# Patient Record
Sex: FEMALE | Race: WHITE | NOT HISPANIC OR LATINO
[De-identification: names, ages, dates, MRNs, and addresses within clinical notes are randomized per-mention and may not be internally consistent; named-entity substitution may affect disease eponyms.]

---

## 2021-09-15 ENCOUNTER — NON-APPOINTMENT (OUTPATIENT)
Age: 51
End: 2021-09-15

## 2021-09-15 PROBLEM — Z00.00 ENCOUNTER FOR PREVENTIVE HEALTH EXAMINATION: Status: ACTIVE | Noted: 2021-09-15

## 2021-09-16 ENCOUNTER — NON-APPOINTMENT (OUTPATIENT)
Age: 51
End: 2021-09-16

## 2021-09-16 ENCOUNTER — APPOINTMENT (OUTPATIENT)
Dept: SURGERY | Facility: CLINIC | Age: 51
End: 2021-09-16
Payer: COMMERCIAL

## 2021-09-16 VITALS
SYSTOLIC BLOOD PRESSURE: 131 MMHG | HEART RATE: 73 BPM | WEIGHT: 198 LBS | DIASTOLIC BLOOD PRESSURE: 80 MMHG | BODY MASS INDEX: 29.33 KG/M2 | HEIGHT: 69 IN

## 2021-09-16 DIAGNOSIS — Z83.49 FAMILY HISTORY OF OTHER ENDOCRINE, NUTRITIONAL AND METABOLIC DISEASES: ICD-10-CM

## 2021-09-16 DIAGNOSIS — Z86.711 PERSONAL HISTORY OF PULMONARY EMBOLISM: ICD-10-CM

## 2021-09-16 DIAGNOSIS — Z87.891 PERSONAL HISTORY OF NICOTINE DEPENDENCE: ICD-10-CM

## 2021-09-16 DIAGNOSIS — Z78.9 OTHER SPECIFIED HEALTH STATUS: ICD-10-CM

## 2021-09-16 PROCEDURE — 99204 OFFICE O/P NEW MOD 45 MIN: CPT

## 2021-09-16 RX ORDER — MONTELUKAST SODIUM 10 MG/1
TABLET, FILM COATED ORAL
Refills: 0 | Status: ACTIVE | COMMUNITY

## 2021-09-18 PROBLEM — Z83.49 FAMILY HISTORY OF HYPOTHYROIDISM: Status: ACTIVE | Noted: 2021-09-18

## 2021-09-18 PROBLEM — Z86.711 HISTORY OF PULMONARY EMBOLISM: Status: RESOLVED | Noted: 2021-09-18 | Resolved: 2021-09-18

## 2021-09-18 PROBLEM — Z87.891 FORMER SMOKER: Status: ACTIVE | Noted: 2021-09-18

## 2021-09-18 PROBLEM — Z78.9 DENIES ALCOHOL CONSUMPTION: Status: ACTIVE | Noted: 2021-09-18

## 2021-09-18 NOTE — PHYSICAL EXAM
[de-identified] : no cervical or supraclavicular adenopathy, trachea midline, thyroid without enlargement or palpable mass [de-identified] : Skin:  normal appearance.  no rash, nodules, vesicles, or erythema,\par Musculoskeletal:  full range of motion and no deformities appreciated\par Neurological:  grossly intact\par Psychiatric:  oriented to person, place and time with appropriate affect [Normal] : no neck adenopathy

## 2021-09-18 NOTE — CONSULT LETTER
[Dear  ___] : Dear  [unfilled], [Consult Letter:] : I had the pleasure of evaluating your patient, [unfilled]. [Please see my note below.] : Please see my note below. [Consult Closing:] : Thank you very much for allowing me to participate in the care of this patient.  If you have any questions, please do not hesitate to contact me. [FreeTextEntry3] : Sincerely yours,\par \par Krupa Arevalo MD, FACS\par Assistant Professor of Surgery and Otolaryngology\par [facility] [FreeTextEntry2] : Dr. Craig Perlman [DrHiwot  ___] : Dr. JAIN

## 2021-09-18 NOTE — ASSESSMENT
[FreeTextEntry1] : Patient with suspicious right thyroid nodule and a multinodular goiter. I have recommended thyroid surgery for definitive diagnosis. The patient would like to proceed with a total thyroidectomy with central neck dissection.  I have reviewed the pathophysiology of the disease process, the area anatomy and the rationale for surgery.  I have discussed the risks, benefits and alternative treatments which include but are not limited to bleeding, infection, numbness, hoarseness, hypocalcemia, scarring, and need for reoperation. I have answered the patient's questions to their satisfaction. The patient wishes to proceed with recommended procedure.They will contact my office to schedule surgery.

## 2021-09-18 NOTE — HISTORY OF PRESENT ILLNESS
[de-identified] : Patient referred by Dr. Perlman for evaluation of suspicious thyroid nodule in a multinodular goiter. Patient was present at 9/11. Reports history of left thyroid nodule for several years. Denies prior biopsy. Recent ultrasound , right lobe 5.1 x 1.6 x 1.3 CM with new upper nodule measuring 11 x 10 x 6 mm. Left lobe 4.4 x 1.5 x 1.5 CM with a 6 mm. Upper and 9 mm. It nodules. Biopsy right nodule, follicular neoplasm, Thyroseq positive with 60% risk of malignancy.  Blood work, July 2021: Calcium 9.4, PTH 61, vitamin D 18. Patient denies dysphagia, reports occasional voice change due to postnasal drip. Patient had PE after sedentary job, hematology work up negative.   I have reviewed all old and new data and available images.  Additional information was obtained from others present at the time of the visit to ensure the completeness of the history.\par

## 2021-09-18 NOTE — REASON FOR VISIT
[Initial Consultation] : an initial consultation for [FreeTextEntry2] : suspicious thyroid nodule in  a MNG [Spouse] : spouse

## 2021-09-22 ENCOUNTER — OUTPATIENT (OUTPATIENT)
Dept: OUTPATIENT SERVICES | Facility: HOSPITAL | Age: 51
LOS: 1 days | End: 2021-09-22
Payer: COMMERCIAL

## 2021-09-22 ENCOUNTER — TRANSCRIPTION ENCOUNTER (OUTPATIENT)
Age: 51
End: 2021-09-22

## 2021-09-22 VITALS
HEART RATE: 69 BPM | WEIGHT: 202.83 LBS | OXYGEN SATURATION: 100 % | SYSTOLIC BLOOD PRESSURE: 127 MMHG | HEIGHT: 68 IN | RESPIRATION RATE: 16 BRPM | DIASTOLIC BLOOD PRESSURE: 86 MMHG | TEMPERATURE: 99 F

## 2021-09-22 DIAGNOSIS — Z98.890 OTHER SPECIFIED POSTPROCEDURAL STATES: Chronic | ICD-10-CM

## 2021-09-22 DIAGNOSIS — D49.7 NEOPLASM OF UNSPECIFIED BEHAVIOR OF ENDOCRINE GLANDS AND OTHER PARTS OF NERVOUS SYSTEM: ICD-10-CM

## 2021-09-22 DIAGNOSIS — I26.99 OTHER PULMONARY EMBOLISM WITHOUT ACUTE COR PULMONALE: ICD-10-CM

## 2021-09-22 DIAGNOSIS — Z01.818 ENCOUNTER FOR OTHER PREPROCEDURAL EXAMINATION: ICD-10-CM

## 2021-09-22 DIAGNOSIS — I80.00 PHLEBITIS AND THROMBOPHLEBITIS OF SUPERFICIAL VESSELS OF UNSPECIFIED LOWER EXTREMITY: ICD-10-CM

## 2021-09-22 DIAGNOSIS — Z98.891 HISTORY OF UTERINE SCAR FROM PREVIOUS SURGERY: Chronic | ICD-10-CM

## 2021-09-22 LAB
HCT VFR BLD CALC: 41.1 % — SIGNIFICANT CHANGE UP (ref 34.5–45)
HGB BLD-MCNC: 14.1 G/DL — SIGNIFICANT CHANGE UP (ref 11.5–15.5)
MCHC RBC-ENTMCNC: 31.6 PG — SIGNIFICANT CHANGE UP (ref 27–34)
MCHC RBC-ENTMCNC: 34.3 GM/DL — SIGNIFICANT CHANGE UP (ref 32–36)
MCV RBC AUTO: 92.2 FL — SIGNIFICANT CHANGE UP (ref 80–100)
NRBC # BLD: 0 /100 WBCS — SIGNIFICANT CHANGE UP (ref 0–0)
PLATELET # BLD AUTO: 228 K/UL — SIGNIFICANT CHANGE UP (ref 150–400)
RBC # BLD: 4.46 M/UL — SIGNIFICANT CHANGE UP (ref 3.8–5.2)
RBC # FLD: 12.3 % — SIGNIFICANT CHANGE UP (ref 10.3–14.5)
WBC # BLD: 4.96 K/UL — SIGNIFICANT CHANGE UP (ref 3.8–10.5)
WBC # FLD AUTO: 4.96 K/UL — SIGNIFICANT CHANGE UP (ref 3.8–10.5)

## 2021-09-22 PROCEDURE — 85027 COMPLETE CBC AUTOMATED: CPT

## 2021-09-22 PROCEDURE — 36415 COLL VENOUS BLD VENIPUNCTURE: CPT

## 2021-09-22 PROCEDURE — G0463: CPT

## 2021-09-22 NOTE — H&P PST ADULT - NSANTHSNORERD_ENT_A_CORE
PT states compliant c tx. Denies any new medications or bleeding issues. Denies any s/s of blood clot. Instructed to continue same dose and Coumadin friendly diet. PT will be seen in 1 month. Patient instructed regarding medication; results given and questions answered. Nutritional counseling given.  Dietary factors affecting therapy addressed.  Patient instructed to monitor for excessive bruising or bleeding. PT verbalizes understanding.         This document has been electronically signed by DAISY Hoff @ on December 28, 2018 3:14 PM      
No

## 2021-09-22 NOTE — H&P PST ADULT - HISTORY OF PRESENT ILLNESS
This 50 year old female with pre-operative diagnosis of lesion on thyroid gland.  s/p biopsy of nodule which returned wtih a 60% chance of turning into a malignancy.  Patient has a long history of thyroid nodule on left side, however the 2 other nodules were more recent.  Denies any other medical issues.  Feels well today. Denies any complaints.  H/o pulmonary embolism x 3 and DVT x1 in 10/2020.  In 10/2020, patient reports she was working five 12 hr shifts consecutively which required her to sit for 12 hrs straight.  Two days later she developed chest pain  and leg pain with SOB. She was diagnosed with PE and DVT.  Denied any recent surgery or hormone use.  S/p workup with hematologist, with unknown etiology.  Patient was given a blood thinner for 6 months, which was discontinued 5/2021.  Denies any events since that time.  She is currently under the care of a pulmonologist for h/o PE's.  She is scheduled to have CT of chest on 9/23/21.  Patient has been getting follow up CT every 3 months since the pulmonary emboli in 10/2020.

## 2021-09-22 NOTE — H&P PST ADULT - NSICDXPASTMEDICALHX_GEN_ALL_CORE_FT
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

## 2021-09-22 NOTE — H&P PST ADULT - NSICDXFAMILYHX_GEN_ALL_CORE_FT
FAMILY HISTORY:  Father  Still living? Unknown  FH: type 2 diabetes, Age at diagnosis: Age Unknown    Mother  Still living? Unknown  FH: HTN (hypertension), Age at diagnosis: Age Unknown

## 2021-09-22 NOTE — H&P PST ADULT - NSANTHOSAYNRD_GEN_A_CORE
No. ROBER screening performed.  STOP BANG Legend: 0-2 = LOW Risk; 3-4 = INTERMEDIATE Risk; 5-8 = HIGH Risk

## 2021-09-22 NOTE — H&P PST ADULT - PROBLEM SELECTOR PLAN 1
Patient provided with pre-operative instructions and verbalized understanding.  Patient will be NPO on day of surgery. Patient will stop NSAIDs, aspirin, herbal supplements or vitamins 1 week prior to surgery.  Chlorohexadine wash provided with instructions.  Pending hematology clearance due to recent h/o PE and DVT.  COVID PCR pending per policy.

## 2021-09-30 ENCOUNTER — LABORATORY RESULT (OUTPATIENT)
Age: 51
End: 2021-09-30

## 2021-10-01 ENCOUNTER — APPOINTMENT (OUTPATIENT)
Dept: DISASTER EMERGENCY | Facility: CLINIC | Age: 51
End: 2021-10-01

## 2021-10-01 PROBLEM — I26.99 OTHER PULMONARY EMBOLISM WITHOUT ACUTE COR PULMONALE: Chronic | Status: ACTIVE | Noted: 2021-09-22

## 2021-10-01 PROBLEM — D49.7 NEOPLASM OF UNSPECIFIED BEHAVIOR OF ENDOCRINE GLANDS AND OTHER PARTS OF NERVOUS SYSTEM: Chronic | Status: ACTIVE | Noted: 2021-09-22

## 2021-10-01 PROBLEM — I82.409 ACUTE EMBOLISM AND THROMBOSIS OF UNSPECIFIED DEEP VEINS OF UNSPECIFIED LOWER EXTREMITY: Chronic | Status: ACTIVE | Noted: 2021-09-22

## 2021-10-03 ENCOUNTER — TRANSCRIPTION ENCOUNTER (OUTPATIENT)
Age: 51
End: 2021-10-03

## 2021-10-04 ENCOUNTER — OUTPATIENT (OUTPATIENT)
Dept: OUTPATIENT SERVICES | Facility: HOSPITAL | Age: 51
LOS: 1 days | End: 2021-10-04
Payer: COMMERCIAL

## 2021-10-04 ENCOUNTER — APPOINTMENT (OUTPATIENT)
Dept: SURGERY | Facility: HOSPITAL | Age: 51
End: 2021-10-04

## 2021-10-04 ENCOUNTER — RESULT REVIEW (OUTPATIENT)
Age: 51
End: 2021-10-04

## 2021-10-04 VITALS
TEMPERATURE: 98 F | SYSTOLIC BLOOD PRESSURE: 146 MMHG | HEART RATE: 76 BPM | DIASTOLIC BLOOD PRESSURE: 83 MMHG | OXYGEN SATURATION: 98 % | RESPIRATION RATE: 16 BRPM

## 2021-10-04 VITALS
DIASTOLIC BLOOD PRESSURE: 85 MMHG | WEIGHT: 202.83 LBS | SYSTOLIC BLOOD PRESSURE: 139 MMHG | TEMPERATURE: 98 F | HEART RATE: 85 BPM | HEIGHT: 68 IN | OXYGEN SATURATION: 97 % | RESPIRATION RATE: 14 BRPM

## 2021-10-04 DIAGNOSIS — D49.7 NEOPLASM OF UNSPECIFIED BEHAVIOR OF ENDOCRINE GLANDS AND OTHER PARTS OF NERVOUS SYSTEM: ICD-10-CM

## 2021-10-04 DIAGNOSIS — Z98.891 HISTORY OF UTERINE SCAR FROM PREVIOUS SURGERY: Chronic | ICD-10-CM

## 2021-10-04 DIAGNOSIS — Z98.890 OTHER SPECIFIED POSTPROCEDURAL STATES: Chronic | ICD-10-CM

## 2021-10-04 PROCEDURE — 88307 TISSUE EXAM BY PATHOLOGIST: CPT | Mod: 26

## 2021-10-04 PROCEDURE — 60252 REMOVAL OF THYROID: CPT

## 2021-10-04 PROCEDURE — 60252 REMOVAL OF THYROID: CPT | Mod: AS

## 2021-10-04 PROCEDURE — C1889: CPT

## 2021-10-04 PROCEDURE — 13132 CMPLX RPR F/C/C/M/N/AX/G/H/F: CPT | Mod: 59

## 2021-10-04 PROCEDURE — 88307 TISSUE EXAM BY PATHOLOGIST: CPT

## 2021-10-04 RX ORDER — HYDROMORPHONE HYDROCHLORIDE 2 MG/ML
1 INJECTION INTRAMUSCULAR; INTRAVENOUS; SUBCUTANEOUS ONCE
Refills: 0 | Status: DISCONTINUED | OUTPATIENT
Start: 2021-10-04 | End: 2021-10-04

## 2021-10-04 RX ORDER — IBUPROFEN 200 MG
1 TABLET ORAL
Qty: 0 | Refills: 0 | DISCHARGE

## 2021-10-04 RX ORDER — SODIUM CHLORIDE 9 MG/ML
1000 INJECTION, SOLUTION INTRAVENOUS
Refills: 0 | Status: DISCONTINUED | OUTPATIENT
Start: 2021-10-04 | End: 2021-10-04

## 2021-10-04 RX ORDER — ONDANSETRON 8 MG/1
4 TABLET, FILM COATED ORAL ONCE
Refills: 0 | Status: COMPLETED | OUTPATIENT
Start: 2021-10-04 | End: 2021-10-04

## 2021-10-04 RX ORDER — BENZOCAINE AND MENTHOL 5; 1 G/100ML; G/100ML
1 LIQUID ORAL
Qty: 0 | Refills: 0 | DISCHARGE
Start: 2021-10-04

## 2021-10-04 RX ORDER — BENZOCAINE AND MENTHOL 5; 1 G/100ML; G/100ML
1 LIQUID ORAL
Refills: 0 | Status: DISCONTINUED | OUTPATIENT
Start: 2021-10-04 | End: 2021-10-18

## 2021-10-04 RX ORDER — HYDROMORPHONE HYDROCHLORIDE 2 MG/ML
0.5 INJECTION INTRAMUSCULAR; INTRAVENOUS; SUBCUTANEOUS
Refills: 0 | Status: DISCONTINUED | OUTPATIENT
Start: 2021-10-04 | End: 2021-10-04

## 2021-10-04 RX ORDER — ACETAMINOPHEN 500 MG
2 TABLET ORAL
Qty: 0 | Refills: 0 | DISCHARGE

## 2021-10-04 RX ORDER — ACETAMINOPHEN 500 MG
650 TABLET ORAL ONCE
Refills: 0 | Status: COMPLETED | OUTPATIENT
Start: 2021-10-04 | End: 2021-10-04

## 2021-10-04 RX ORDER — ONDANSETRON 8 MG/1
4 TABLET, FILM COATED ORAL ONCE
Refills: 0 | Status: DISCONTINUED | OUTPATIENT
Start: 2021-10-04 | End: 2021-10-04

## 2021-10-04 RX ORDER — HYDROMORPHONE HYDROCHLORIDE 2 MG/ML
0.5 INJECTION INTRAMUSCULAR; INTRAVENOUS; SUBCUTANEOUS ONCE
Refills: 0 | Status: DISCONTINUED | OUTPATIENT
Start: 2021-10-04 | End: 2021-10-04

## 2021-10-04 RX ADMIN — Medication 2 TABLET(S): at 09:43

## 2021-10-04 RX ADMIN — HYDROMORPHONE HYDROCHLORIDE 0.5 MILLIGRAM(S): 2 INJECTION INTRAMUSCULAR; INTRAVENOUS; SUBCUTANEOUS at 10:10

## 2021-10-04 RX ADMIN — HYDROMORPHONE HYDROCHLORIDE 0.5 MILLIGRAM(S): 2 INJECTION INTRAMUSCULAR; INTRAVENOUS; SUBCUTANEOUS at 09:50

## 2021-10-04 RX ADMIN — Medication 650 MILLIGRAM(S): at 14:39

## 2021-10-04 RX ADMIN — SODIUM CHLORIDE 50 MILLILITER(S): 9 INJECTION, SOLUTION INTRAVENOUS at 06:32

## 2021-10-04 RX ADMIN — HYDROMORPHONE HYDROCHLORIDE 0.5 MILLIGRAM(S): 2 INJECTION INTRAMUSCULAR; INTRAVENOUS; SUBCUTANEOUS at 09:35

## 2021-10-04 RX ADMIN — ONDANSETRON 4 MILLIGRAM(S): 8 TABLET, FILM COATED ORAL at 11:03

## 2021-10-04 RX ADMIN — HYDROMORPHONE HYDROCHLORIDE 0.5 MILLIGRAM(S): 2 INJECTION INTRAMUSCULAR; INTRAVENOUS; SUBCUTANEOUS at 10:20

## 2021-10-04 RX ADMIN — HYDROMORPHONE HYDROCHLORIDE 0.5 MILLIGRAM(S): 2 INJECTION INTRAMUSCULAR; INTRAVENOUS; SUBCUTANEOUS at 10:05

## 2021-10-04 RX ADMIN — Medication 650 MILLIGRAM(S): at 14:09

## 2021-10-04 NOTE — ASU DISCHARGE PLAN (ADULT/PEDIATRIC) - NURSING INSTRUCTIONS
drink plenty fluids,  empty MEG drain twice a day, record amount,  use ice packs 20-3o min on/off as needed for pain and edema control

## 2021-10-04 NOTE — ASU DISCHARGE PLAN (ADULT/PEDIATRIC) - ASU DC SPECIAL INSTRUCTIONSFT
Keep head of bed elevated to alleviate discomfort/swelling.  Wear supportive bra at all times (except when showering) to alleviate strain on incision and neck muscles.  Follow-up Dr. Arevalo tomorrow 10/05/21 for drain removal.  No NSAIDs (Ibuprofen, Motrin, Alleve, aspirin) without Dr. Arevalo approval. Keep head of bed elevated to alleviate discomfort/swelling.  Wear supportive bra at all times (except when showering) to alleviate strain on incision and neck muscles.  Follow-up Dr. Arevalo tomorrow 10/05/21 @ 9:30am for drain removal.  No NSAIDs (Ibuprofen, Motrin, Alleve, aspirin) without Dr. Arevalo approval.

## 2021-10-04 NOTE — PRE-ANESTHESIA EVALUATION ADULT - NSANTHADDINFOFT_GEN_ALL_CORE
Discussed GA with ETT in detail with patient and all questions sought and answered. Pt. agrees to all plans and wishes to proceed with surgical care.    Medical/Heme evaluation/optimization and clearance noted and appreciated.

## 2021-10-04 NOTE — ASU PATIENT PROFILE, ADULT - VISION (WITH CORRECTIVE LENSES IF THE PATIENT USUALLY WEARS THEM):
Please return to the emergency department if your symptoms worsen or if you develop any concerning symptoms such as chest pain, shortness of breath, fevers, chills, nausea, vomiting.    Please follow-up with your primary care provider regarding symptoms.   Normal vision: sees adequately in most situations; can see medication labels, newsprint reading glasses/Normal vision: sees adequately in most situations; can see medication labels, newsprint

## 2021-10-04 NOTE — ASU DISCHARGE PLAN (ADULT/PEDIATRIC) - CARE PROVIDER_API CALL
Krupa Arevalo)  Surgery  [address] [place], [address]  [place] [address]  Phone: [phone]  Fax: [phone]  Follow Up Time:

## 2021-10-05 ENCOUNTER — APPOINTMENT (OUTPATIENT)
Dept: SURGERY | Facility: CLINIC | Age: 51
End: 2021-10-05
Payer: COMMERCIAL

## 2021-10-05 PROCEDURE — 99024 POSTOP FOLLOW-UP VISIT: CPT

## 2021-10-05 NOTE — HISTORY OF PRESENT ILLNESS
[de-identified] : Patient referred by Dr. Perlman for evaluation of suspicious thyroid nodule in a multinodular goiter. Patient was present at 9/11. Reports history of left thyroid nodule for several years. Denies prior biopsy. Recent ultrasound , right lobe 5.1 x 1.6 x 1.3 CM with new upper nodule measuring 11 x 10 x 6 mm. Left lobe 4.4 x 1.5 x 1.5 CM with a 6 mm. Upper and 9 mm. It nodules. Biopsy right nodule, follicular neoplasm, Thyroseq positive with 60% risk of malignancy.  Blood work, July 2021: Calcium 9.4, PTH 61, vitamin D 18. Patient denies dysphagia, reports occasional voice change due to postnasal drip. Patient had PE after sedentary job, hematology work up negative.\par 10/4/21 total thyroidecotmy with CND.  Patient denies dysphagia, hoarseness, pain or parathesias.\par    I have reviewed all old and new data and available images.  Additional information was obtained from others present at the time of the visit to ensure the completeness of the history.\par

## 2021-10-05 NOTE — PHYSICAL EXAM
[de-identified] : dressing intact, MEG serour removed.  no cervical or supraclavicular adenopathy, trachea midline, no palpable mass [Normal] : no neck adenopathy [de-identified] : Skin:  normal appearance.  no rash, nodules, vesicles, or erythema,\par Musculoskeletal:  full range of motion and no deformities appreciated\par Neurological:  grossly intact\par Psychiatric:  oriented to person, place and time with appropriate affect

## 2021-10-05 NOTE — ASSESSMENT
[FreeTextEntry1] : Patient with suspicious right thyroid nodule and a multinodular goiter. Doing well postop.  MEG removed. RTO 1 week. I have answered their questions to the best of my ability.\par

## 2021-10-11 LAB — SURGICAL PATHOLOGY STUDY: SIGNIFICANT CHANGE UP

## 2021-10-14 ENCOUNTER — APPOINTMENT (OUTPATIENT)
Dept: SURGERY | Facility: CLINIC | Age: 51
End: 2021-10-14
Payer: COMMERCIAL

## 2021-10-14 DIAGNOSIS — E04.2 NONTOXIC MULTINODULAR GOITER: ICD-10-CM

## 2021-10-14 DIAGNOSIS — D49.7 NEOPLASM OF UNSPECIFIED BEHAVIOR OF ENDOCRINE GLANDS AND OTHER PARTS OF NERVOUS SYSTEM: ICD-10-CM

## 2021-10-14 PROCEDURE — 99024 POSTOP FOLLOW-UP VISIT: CPT

## 2021-10-14 NOTE — ASSESSMENT
[FreeTextEntry1] : Patient with  papillary thyroid cancer. no further treatment needed.   will see Dr Perlman in 1 month. RTO 4 mo.  I reviewed the expected course of illness and the intent of current treatment with the patient. I have answered her questions.\par

## 2021-10-14 NOTE — HISTORY OF PRESENT ILLNESS
[de-identified] : Patient referred by Dr. Perlman for evaluation of suspicious thyroid nodule in a multinodular goiter. Patient was present at 9/11. Reports history of left thyroid nodule for several years. Denies prior biopsy. Recent ultrasound , right lobe 5.1 x 1.6 x 1.3 CM with new upper nodule measuring 11 x 10 x 6 mm. Left lobe 4.4 x 1.5 x 1.5 CM with a 6 mm. Upper and 9 mm. It nodules. Biopsy right nodule, follicular neoplasm, Thyroseq positive with 60% risk of malignancy.  Blood work, July 2021: Calcium 9.4, PTH 61, vitamin D 18. Patient denies dysphagia, reports occasional voice change due to postnasal drip. Patient had PE after sedentary job, hematology work up negative.\par 10/4/21 total thyroidecotmy with CND. pathology papillary thyroid cancer 1 cm with negative LNs.    Patient denies dysphagia, hoarseness, pain or parathesias.  I have reviewed all old and new data and available images.  Additional information was obtained from others present at the time of the visit to ensure the completeness of the history.\par

## 2021-10-14 NOTE — PHYSICAL EXAM
[de-identified] : incision healing without swelling, scar min discussed.  no cervical or supraclavicular adenopathy, trachea midline, no palpable mass [Normal] : no neck adenopathy [de-identified] : Skin:  normal appearance.  no rash, nodules, vesicles, or erythema,\par Musculoskeletal:  full range of motion and no deformities appreciated\par Neurological:  grossly intact\par Psychiatric:  oriented to person, place and time with appropriate affect

## 2022-02-15 ENCOUNTER — APPOINTMENT (OUTPATIENT)
Dept: SURGERY | Facility: CLINIC | Age: 52
End: 2022-02-15
Payer: COMMERCIAL

## 2022-02-15 ENCOUNTER — LABORATORY RESULT (OUTPATIENT)
Age: 52
End: 2022-02-15

## 2022-02-15 DIAGNOSIS — C73 MALIGNANT NEOPLASM OF THYROID GLAND: ICD-10-CM

## 2022-02-15 DIAGNOSIS — E89.0 POSTPROCEDURAL HYPOTHYROIDISM: ICD-10-CM

## 2022-02-15 PROCEDURE — 99213 OFFICE O/P EST LOW 20 MIN: CPT

## 2022-02-15 RX ORDER — ESCITALOPRAM OXALATE 10 MG/1
10 TABLET ORAL
Qty: 30 | Refills: 0 | Status: ACTIVE | COMMUNITY
Start: 2022-02-03

## 2022-02-15 RX ORDER — CELECOXIB 100 MG/1
100 CAPSULE ORAL
Qty: 30 | Refills: 0 | Status: ACTIVE | COMMUNITY
Start: 2021-12-23

## 2022-02-15 RX ORDER — ERGOCALCIFEROL 1.25 MG/1
1.25 MG CAPSULE, LIQUID FILLED ORAL
Qty: 8 | Refills: 0 | Status: ACTIVE | COMMUNITY
Start: 2022-02-03

## 2022-02-15 RX ORDER — CLONAZEPAM 1 MG/1
1 TABLET ORAL
Qty: 60 | Refills: 0 | Status: ACTIVE | COMMUNITY
Start: 2022-02-03

## 2022-02-15 RX ORDER — LEVOTHYROXINE SODIUM 0.12 MG/1
125 TABLET ORAL DAILY
Qty: 30 | Refills: 5 | Status: DISCONTINUED | COMMUNITY
Start: 2021-10-05 | End: 2022-02-15

## 2022-02-15 RX ORDER — NAPROXEN 500 MG/1
500 TABLET ORAL
Qty: 14 | Refills: 0 | Status: ACTIVE | COMMUNITY
Start: 2021-10-25

## 2022-02-15 NOTE — ASSESSMENT
[FreeTextEntry1] : Patient with  papillary thyroid cancer. no further treatment needed.   daisy sent,  request neck US 7/2022  . RTO 6  mo.  I reviewed the expected course of illness and the intent of current treatment with the patient. I have answered her questions.\par

## 2022-02-15 NOTE — HISTORY OF PRESENT ILLNESS
[de-identified] : Patient referred by Dr. Perlman for evaluation of suspicious thyroid nodule in a multinodular goiter. Patient was present at 9/11. Reports history of left thyroid nodule for several years. Denies prior biopsy. Recent ultrasound , right lobe 5.1 x 1.6 x 1.3 CM with new upper nodule measuring 11 x 10 x 6 mm. Left lobe 4.4 x 1.5 x 1.5 CM with a 6 mm. Upper and 9 mm. It nodules. Biopsy right nodule, follicular neoplasm, Thyroseq positive with 60% risk of malignancy.  Blood work, July 2021: Calcium 9.4, PTH 61, vitamin D 18. Patient denies dysphagia, reports occasional voice change due to postnasal drip. Patient had PE after sedentary job, hematology work up negative.\par 10/4/21 total thyroidecotmy with CND. pathology papillary thyroid cancer 1 cm with negative LNs.    Patient denies dysphagia, hoarseness, pain or parathesias. Has been on 150 mcg for 2 months. per Dr Perlman.  I have reviewed all old and new data and available images.

## 2022-02-15 NOTE — PHYSICAL EXAM
[de-identified] : incision healing well, scar min discussed.  no cervical or supraclavicular adenopathy, trachea midline, no palpable mass [Normal] : no neck adenopathy [de-identified] : Skin:  normal appearance.  no rash, nodules, vesicles, or erythema,\par Musculoskeletal:  full range of motion and no deformities appreciated\par Neurological:  grossly intact\par Psychiatric:  oriented to person, place and time with appropriate affect

## 2022-02-23 ENCOUNTER — NON-APPOINTMENT (OUTPATIENT)
Age: 52
End: 2022-02-23

## 2022-02-23 LAB
T3 SERPL-MCNC: 96 NG/DL
T4 FREE SERPL-MCNC: 2.1 NG/DL
THYROGLOB AB SERPL-ACNC: <20 IU/ML
THYROGLOB SERPL-MCNC: <0.2 NG/ML
TSH SERPL-ACNC: 0.06 UIU/ML

## 2022-02-23 RX ORDER — LEVOTHYROXINE SODIUM 0.15 MG/1
150 TABLET ORAL DAILY
Qty: 90 | Refills: 3 | Status: ACTIVE | COMMUNITY
Start: 2021-11-23 | End: 1900-01-01

## 2022-08-16 ENCOUNTER — APPOINTMENT (OUTPATIENT)
Dept: SURGERY | Facility: CLINIC | Age: 52
End: 2022-08-16

## 2022-11-21 ENCOUNTER — APPOINTMENT (OUTPATIENT)
Dept: ORTHOPEDIC SURGERY | Facility: CLINIC | Age: 52
End: 2022-11-21

## 2022-11-21 VITALS — WEIGHT: 198 LBS | BODY MASS INDEX: 29.33 KG/M2 | HEIGHT: 69 IN

## 2022-11-21 PROCEDURE — 73030 X-RAY EXAM OF SHOULDER: CPT | Mod: RT

## 2022-11-21 PROCEDURE — 99203 OFFICE O/P NEW LOW 30 MIN: CPT | Mod: 25

## 2022-11-21 PROCEDURE — 20610 DRAIN/INJ JOINT/BURSA W/O US: CPT | Mod: RT

## 2022-11-21 NOTE — HISTORY OF PRESENT ILLNESS
[7] : 7 [3] : 3 [Dull/Aching] : dull/aching [Sharp] : sharp [Constant] : constant [Sleep] : sleep [Meds] : meds [de-identified] : 11/21/22: 52 yo LHD (Retired ) with right shoulder pain ongoing since 9/2022. She denies injury. Pain worse with OH reaching. Reports a dull ache. No n/t. There is night pain. Pain with reaching posteriorly. She has been taking naproxen with some relief.  [] : no [FreeTextEntry1] : RT shoulder  [FreeTextEntry5] : RT shoulder pain which started 2 months ago. Pain has gotten worse over time. PT denies traumatic injury. PT has N/T down her arm but she believes this is associated with her neck issues. Pt has nocturnal disturbances due to pain.  [FreeTextEntry7] : into her bicep

## 2022-11-21 NOTE — PHYSICAL EXAM
[Standing] : standing [Moderate] : moderate [5 ___] : forward flexion 5[unfilled]/5 [5___] : external rotation 5[unfilled]/5 [] : motor and sensory intact distally [Right] : right shoulder [There are no fractures, subluxations or dislocations. No significant abnormalities are seen] : There are no fractures, subluxations or dislocations. No significant abnormalities are seen [TWNoteComboBox7] : active forward flexion 160 degrees [TWNoteComboBox6] : internal rotation L5 [de-identified] : external rotation 50 degrees

## 2022-12-19 ENCOUNTER — APPOINTMENT (OUTPATIENT)
Dept: ORTHOPEDIC SURGERY | Facility: CLINIC | Age: 52
End: 2022-12-19

## 2022-12-29 ENCOUNTER — APPOINTMENT (OUTPATIENT)
Dept: ORTHOPEDIC SURGERY | Facility: CLINIC | Age: 52
End: 2022-12-29

## 2022-12-29 VITALS — BODY MASS INDEX: 29.33 KG/M2 | HEIGHT: 69 IN | WEIGHT: 198 LBS

## 2022-12-29 PROCEDURE — 99213 OFFICE O/P EST LOW 20 MIN: CPT

## 2022-12-29 NOTE — PHYSICAL EXAM
[Standing] : standing [Moderate] : moderate [5 ___] : forward flexion 5[unfilled]/5 [5___] : external rotation 5[unfilled]/5 [] : motor and sensory intact distally [Right] : right shoulder [There are no fractures, subluxations or dislocations. No significant abnormalities are seen] : There are no fractures, subluxations or dislocations. No significant abnormalities are seen [TWNoteComboBox7] : active forward flexion 160 degrees [de-identified] : external rotation 50 degrees [TWNoteComboBox6] : internal rotation L5

## 2022-12-29 NOTE — HISTORY OF PRESENT ILLNESS
[7] : 7 [3] : 3 [Dull/Aching] : dull/aching [Sharp] : sharp [Constant] : constant [Sleep] : sleep [Meds] : meds [de-identified] : 11/21/22: 52 yo LHD (Retired ) with right shoulder pain ongoing since 9/2022. She denies injury. Pain worse with OH reaching. Reports a dull ache. No n/t. There is night pain. Pain with reaching posteriorly. She has been taking naproxen with some relief.  [] : no [FreeTextEntry1] : RT shoulder  [FreeTextEntry5] : RT shoulder pain which started 2 months ago. Pain has gotten worse over time. PT denies traumatic injury. PT has N/T down her arm but she believes this is associated with her neck issues. Pt has nocturnal disturbances due to pain.  [FreeTextEntry7] : into her bicep [de-identified] : cortisone injection

## 2023-01-03 ENCOUNTER — FORM ENCOUNTER (OUTPATIENT)
Age: 53
End: 2023-01-03

## 2023-01-04 ENCOUNTER — APPOINTMENT (OUTPATIENT)
Dept: MRI IMAGING | Facility: CLINIC | Age: 53
End: 2023-01-04
Payer: COMMERCIAL

## 2023-01-04 PROCEDURE — 73221 MRI JOINT UPR EXTREM W/O DYE: CPT | Mod: RT

## 2023-01-06 ENCOUNTER — APPOINTMENT (OUTPATIENT)
Dept: ORTHOPEDIC SURGERY | Facility: CLINIC | Age: 53
End: 2023-01-06
Payer: COMMERCIAL

## 2023-01-06 VITALS — BODY MASS INDEX: 29.33 KG/M2 | WEIGHT: 198 LBS | HEIGHT: 69 IN

## 2023-01-06 PROCEDURE — 99213 OFFICE O/P EST LOW 20 MIN: CPT

## 2023-01-06 NOTE — REASON FOR VISIT
[FreeTextEntry2] : 1/6/23- Had MRI, not yet read by radiologist\par 12/29/22- Initially had good response to CSI, but pain has returned

## 2023-01-06 NOTE — DATA REVIEWED
[MRI] : MRI [Right] : of the right [Shoulder] : shoulder [I reviewed the films/CD] : I reviewed the films/CD [FreeTextEntry1] : tendinopathy with partial rotator cuff tear

## 2023-01-06 NOTE — DISCUSSION/SUMMARY
[de-identified] : MRI reviewed, no surgery necessary. Needs to start course of PT for rotator cuff strengthening and will see how she progresses

## 2023-01-06 NOTE — PHYSICAL EXAM
[Standing] : standing [Moderate] : moderate [5 ___] : forward flexion 5[unfilled]/5 [5___] : external rotation 5[unfilled]/5 [] : motor and sensory intact distally [Right] : right shoulder [There are no fractures, subluxations or dislocations. No significant abnormalities are seen] : There are no fractures, subluxations or dislocations. No significant abnormalities are seen [TWNoteComboBox7] : active forward flexion 160 degrees [TWNoteComboBox6] : internal rotation L5 [de-identified] : external rotation 50 degrees

## 2023-01-06 NOTE — HISTORY OF PRESENT ILLNESS
[7] : 7 [3] : 3 [Dull/Aching] : dull/aching [Sharp] : sharp [Constant] : constant [Sleep] : sleep [Meds] : meds [de-identified] : 11/21/22: 52 yo LHD (Retired ) with right shoulder pain ongoing since 9/2022. She denies injury. Pain worse with OH reaching. Reports a dull ache. No n/t. There is night pain. Pain with reaching posteriorly. She has been taking naproxen with some relief.  [] : no [FreeTextEntry1] : RT shoulder  [FreeTextEntry5] : RT shoulder pain which started 2 months ago. Pain has gotten worse over time. PT denies traumatic injury. PT has N/T down her arm but she believes this is associated with her neck issues. Pt has nocturnal disturbances due to pain.  [FreeTextEntry7] : into her bicep [de-identified] : cortisone injection

## 2023-02-17 ENCOUNTER — APPOINTMENT (OUTPATIENT)
Dept: ORTHOPEDIC SURGERY | Facility: CLINIC | Age: 53
End: 2023-02-17
Payer: COMMERCIAL

## 2023-02-17 VITALS — BODY MASS INDEX: 29.33 KG/M2 | WEIGHT: 198 LBS | HEIGHT: 69 IN

## 2023-02-17 DIAGNOSIS — M75.41 IMPINGEMENT SYNDROME OF RIGHT SHOULDER: ICD-10-CM

## 2023-02-17 DIAGNOSIS — S43.431D SUPERIOR GLENOID LABRUM LESION OF RIGHT SHOULDER, SUBSEQUENT ENCOUNTER: ICD-10-CM

## 2023-02-17 DIAGNOSIS — M75.111 INCOMPLETE ROTATOR CUFF TEAR OR RUPTURE OF RIGHT SHOULDER, NOT SPECIFIED AS TRAUMATIC: ICD-10-CM

## 2023-02-17 PROCEDURE — 20610 DRAIN/INJ JOINT/BURSA W/O US: CPT

## 2023-02-17 PROCEDURE — 99213 OFFICE O/P EST LOW 20 MIN: CPT | Mod: 25

## 2023-02-17 NOTE — HISTORY OF PRESENT ILLNESS
[7] : 7 [3] : 3 [Dull/Aching] : dull/aching [Sharp] : sharp [Constant] : constant [Sleep] : sleep [Meds] : meds [de-identified] : 2/17/23: Here for fu R shoulder. Doing PT but minimal improvement. Would like to repeat csi.\par \par 11/21/22: 52 yo LHD (Retired ) with right shoulder pain ongoing since 9/2022. She denies injury. Pain worse with OH reaching. Reports a dull ache. No n/t. There is night pain. Pain with reaching posteriorly. She has been taking naproxen with some relief.  [] : no [FreeTextEntry1] : RT shoulder  [FreeTextEntry5] : RT shoulder pain which started 2 months ago. Pain has gotten worse over time. PT denies traumatic injury. PT has N/T down her arm but she believes this is associated with her neck issues. Pt has nocturnal disturbances due to pain.  [FreeTextEntry7] : into her bicep [de-identified] : cortisone injection

## 2023-02-17 NOTE — PROCEDURE
[Right] : of the right [Shoulder] : shoulder [Pain] : pain [Inflammation] : inflammation [Alcohol] : alcohol [Betadine] : betadine [Ethyl Chloride sprayed topically] : ethyl chloride sprayed topically [Sterile technique used] : sterile technique used [___ cc    6mg] :  Betamethasone (Celestone) ~Vcc of 6mg [___ cc    1%] : Lidocaine ~Vcc of 1%  [] : Patient tolerated procedure well [Call if redness, pain or fever occur] : call if redness, pain or fever occur [Apply ice for 15min out of every hour for the next 12-24 hours as tolerated] : apply ice for 15 minutes out of every hour for the next 12-24 hours as tolerated

## 2023-02-17 NOTE — REASON FOR VISIT
[FreeTextEntry2] : 2/17/23- MRI report: Impression:\par 1. Fraying and tear of the superior labrum. Biceps tendinopathy.\par 2. Capsular thickening anterior which can be seen with adhesive capsulitis.\par 3. Trace glenohumeral joint effusion.\par 4. AC joint arthrosis. Supraspinatus tendinopathy and fraying. Infraspinatus tendinopathy with 2 mm traction \par cyst in the humeral head and no fracture.

## 2023-02-17 NOTE — PHYSICAL EXAM
[Standing] : standing [Moderate] : moderate [5 ___] : forward flexion 5[unfilled]/5 [5___] : external rotation 5[unfilled]/5 [] : motor and sensory intact distally [Right] : right shoulder [There are no fractures, subluxations or dislocations. No significant abnormalities are seen] : There are no fractures, subluxations or dislocations. No significant abnormalities are seen [TWNoteComboBox7] : active forward flexion 160 degrees [TWNoteComboBox6] : internal rotation L5 [de-identified] : external rotation 50 degrees

## 2023-03-31 ENCOUNTER — APPOINTMENT (OUTPATIENT)
Dept: ORTHOPEDIC SURGERY | Facility: CLINIC | Age: 53
End: 2023-03-31

## 2024-03-05 ENCOUNTER — APPOINTMENT (OUTPATIENT)
Dept: SURGERY | Facility: CLINIC | Age: 54
End: 2024-03-05

## 2024-06-06 ENCOUNTER — NON-APPOINTMENT (OUTPATIENT)
Age: 54
End: 2024-06-06

## 2024-06-06 DIAGNOSIS — R60.9 EDEMA, UNSPECIFIED: ICD-10-CM

## 2024-06-06 DIAGNOSIS — Z85.9 PERSONAL HISTORY OF MALIGNANT NEOPLASM, UNSPECIFIED: ICD-10-CM

## 2024-06-06 DIAGNOSIS — Z86.718 PERSONAL HISTORY OF OTHER VENOUS THROMBOSIS AND EMBOLISM: ICD-10-CM

## 2024-06-06 DIAGNOSIS — M72.9 FIBROBLASTIC DISORDER, UNSPECIFIED: ICD-10-CM

## 2024-06-06 RX ORDER — ROSUVASTATIN CALCIUM 5 MG/1
TABLET, FILM COATED ORAL
Refills: 0 | Status: ACTIVE | COMMUNITY